# Patient Record
Sex: FEMALE | Race: OTHER | NOT HISPANIC OR LATINO | URBAN - METROPOLITAN AREA
[De-identification: names, ages, dates, MRNs, and addresses within clinical notes are randomized per-mention and may not be internally consistent; named-entity substitution may affect disease eponyms.]

---

## 2021-11-25 ENCOUNTER — INPATIENT (INPATIENT)
Facility: HOSPITAL | Age: 21
LOS: 1 days | Discharge: HOME | End: 2021-11-27
Attending: STUDENT IN AN ORGANIZED HEALTH CARE EDUCATION/TRAINING PROGRAM | Admitting: STUDENT IN AN ORGANIZED HEALTH CARE EDUCATION/TRAINING PROGRAM
Payer: MEDICAID

## 2021-11-25 VITALS
WEIGHT: 134.92 LBS | TEMPERATURE: 97 F | SYSTOLIC BLOOD PRESSURE: 124 MMHG | HEART RATE: 77 BPM | RESPIRATION RATE: 18 BRPM | DIASTOLIC BLOOD PRESSURE: 76 MMHG | OXYGEN SATURATION: 99 %

## 2021-11-25 LAB
ALBUMIN SERPL ELPH-MCNC: 4.4 G/DL — SIGNIFICANT CHANGE UP (ref 3.5–5.2)
ALP SERPL-CCNC: 79 U/L — SIGNIFICANT CHANGE UP (ref 30–115)
ALT FLD-CCNC: 12 U/L — SIGNIFICANT CHANGE UP (ref 0–41)
ANION GAP SERPL CALC-SCNC: 15 MMOL/L — HIGH (ref 7–14)
APAP SERPL-MCNC: <5 UG/ML — LOW (ref 10–30)
APPEARANCE UR: CLEAR — SIGNIFICANT CHANGE UP
AST SERPL-CCNC: 24 U/L — SIGNIFICANT CHANGE UP (ref 0–41)
BACTERIA # UR AUTO: NEGATIVE — SIGNIFICANT CHANGE UP
BASOPHILS # BLD AUTO: 0.02 K/UL — SIGNIFICANT CHANGE UP (ref 0–0.2)
BASOPHILS NFR BLD AUTO: 0.2 % — SIGNIFICANT CHANGE UP (ref 0–1)
BILIRUB SERPL-MCNC: 0.5 MG/DL — SIGNIFICANT CHANGE UP (ref 0.2–1.2)
BILIRUB UR-MCNC: NEGATIVE — SIGNIFICANT CHANGE UP
BUN SERPL-MCNC: 9 MG/DL — LOW (ref 10–20)
CALCIUM SERPL-MCNC: 9.1 MG/DL — SIGNIFICANT CHANGE UP (ref 8.5–10.1)
CHLORIDE SERPL-SCNC: 103 MMOL/L — SIGNIFICANT CHANGE UP (ref 98–110)
CO2 SERPL-SCNC: 18 MMOL/L — SIGNIFICANT CHANGE UP (ref 17–32)
COLOR SPEC: SIGNIFICANT CHANGE UP
CREAT SERPL-MCNC: 0.5 MG/DL — LOW (ref 0.7–1.5)
DIFF PNL FLD: NEGATIVE — SIGNIFICANT CHANGE UP
EOSINOPHIL # BLD AUTO: 0.01 K/UL — SIGNIFICANT CHANGE UP (ref 0–0.7)
EOSINOPHIL NFR BLD AUTO: 0.1 % — SIGNIFICANT CHANGE UP (ref 0–8)
EPI CELLS # UR: 3 /HPF — SIGNIFICANT CHANGE UP (ref 0–5)
ETHANOL SERPL-MCNC: <10 MG/DL — SIGNIFICANT CHANGE UP
GLUCOSE SERPL-MCNC: 146 MG/DL — HIGH (ref 70–99)
GLUCOSE UR QL: NEGATIVE — SIGNIFICANT CHANGE UP
HCG SERPL QL: NEGATIVE — SIGNIFICANT CHANGE UP
HCT VFR BLD CALC: 40.1 % — SIGNIFICANT CHANGE UP (ref 37–47)
HGB BLD-MCNC: 13.3 G/DL — SIGNIFICANT CHANGE UP (ref 12–16)
HYALINE CASTS # UR AUTO: 1 /LPF — SIGNIFICANT CHANGE UP (ref 0–7)
IMM GRANULOCYTES NFR BLD AUTO: 0.3 % — SIGNIFICANT CHANGE UP (ref 0.1–0.3)
KETONES UR-MCNC: ABNORMAL
LACTATE SERPL-SCNC: 1.7 MMOL/L — SIGNIFICANT CHANGE UP (ref 0.7–2)
LEUKOCYTE ESTERASE UR-ACNC: NEGATIVE — SIGNIFICANT CHANGE UP
LYMPHOCYTES # BLD AUTO: 1.99 K/UL — SIGNIFICANT CHANGE UP (ref 1.2–3.4)
LYMPHOCYTES # BLD AUTO: 19.3 % — LOW (ref 20.5–51.1)
MAGNESIUM SERPL-MCNC: 1.8 MG/DL — SIGNIFICANT CHANGE UP (ref 1.8–2.4)
MCHC RBC-ENTMCNC: 29.1 PG — SIGNIFICANT CHANGE UP (ref 27–31)
MCHC RBC-ENTMCNC: 33.2 G/DL — SIGNIFICANT CHANGE UP (ref 32–37)
MCV RBC AUTO: 87.7 FL — SIGNIFICANT CHANGE UP (ref 81–99)
MONOCYTES # BLD AUTO: 0.5 K/UL — SIGNIFICANT CHANGE UP (ref 0.1–0.6)
MONOCYTES NFR BLD AUTO: 4.9 % — SIGNIFICANT CHANGE UP (ref 1.7–9.3)
NEUTROPHILS # BLD AUTO: 7.75 K/UL — HIGH (ref 1.4–6.5)
NEUTROPHILS NFR BLD AUTO: 75.2 % — SIGNIFICANT CHANGE UP (ref 42.2–75.2)
NITRITE UR-MCNC: NEGATIVE — SIGNIFICANT CHANGE UP
NRBC # BLD: 0 /100 WBCS — SIGNIFICANT CHANGE UP (ref 0–0)
PH UR: 6 — SIGNIFICANT CHANGE UP (ref 5–8)
PLATELET # BLD AUTO: 289 K/UL — SIGNIFICANT CHANGE UP (ref 130–400)
POTASSIUM SERPL-MCNC: 5.2 MMOL/L — HIGH (ref 3.5–5)
POTASSIUM SERPL-SCNC: 5.2 MMOL/L — HIGH (ref 3.5–5)
PROT SERPL-MCNC: 7.5 G/DL — SIGNIFICANT CHANGE UP (ref 6–8)
PROT UR-MCNC: ABNORMAL
RBC # BLD: 4.57 M/UL — SIGNIFICANT CHANGE UP (ref 4.2–5.4)
RBC # FLD: 14.1 % — SIGNIFICANT CHANGE UP (ref 11.5–14.5)
RBC CASTS # UR COMP ASSIST: 1 /HPF — SIGNIFICANT CHANGE UP (ref 0–4)
SALICYLATES SERPL-MCNC: <0.3 MG/DL — LOW (ref 4–30)
SARS-COV-2 RNA SPEC QL NAA+PROBE: SIGNIFICANT CHANGE UP
SODIUM SERPL-SCNC: 136 MMOL/L — SIGNIFICANT CHANGE UP (ref 135–146)
SP GR SPEC: 1.03 — SIGNIFICANT CHANGE UP (ref 1.01–1.03)
TROPONIN T SERPL-MCNC: <0.01 NG/ML — SIGNIFICANT CHANGE UP
UROBILINOGEN FLD QL: SIGNIFICANT CHANGE UP
WBC # BLD: 10.3 K/UL — SIGNIFICANT CHANGE UP (ref 4.8–10.8)
WBC # FLD AUTO: 10.3 K/UL — SIGNIFICANT CHANGE UP (ref 4.8–10.8)
WBC UR QL: 3 /HPF — SIGNIFICANT CHANGE UP (ref 0–5)

## 2021-11-25 PROCEDURE — 99223 1ST HOSP IP/OBS HIGH 75: CPT

## 2021-11-25 PROCEDURE — 70450 CT HEAD/BRAIN W/O DYE: CPT | Mod: 26,MA

## 2021-11-25 PROCEDURE — 93010 ELECTROCARDIOGRAM REPORT: CPT

## 2021-11-25 PROCEDURE — 99285 EMERGENCY DEPT VISIT HI MDM: CPT

## 2021-11-25 RX ORDER — ACETAMINOPHEN 500 MG
975 TABLET ORAL ONCE
Refills: 0 | Status: COMPLETED | OUTPATIENT
Start: 2021-11-25 | End: 2021-11-25

## 2021-11-25 RX ORDER — INFLUENZA VIRUS VACCINE 15; 15; 15; 15 UG/.5ML; UG/.5ML; UG/.5ML; UG/.5ML
0.5 SUSPENSION INTRAMUSCULAR ONCE
Refills: 0 | Status: COMPLETED | OUTPATIENT
Start: 2021-11-25 | End: 2021-11-25

## 2021-11-25 RX ORDER — MAGNESIUM OXIDE 400 MG ORAL TABLET 241.3 MG
400 TABLET ORAL ONCE
Refills: 0 | Status: COMPLETED | OUTPATIENT
Start: 2021-11-25 | End: 2021-11-26

## 2021-11-25 RX ORDER — SODIUM CHLORIDE 9 MG/ML
1000 INJECTION, SOLUTION INTRAVENOUS ONCE
Refills: 0 | Status: COMPLETED | OUTPATIENT
Start: 2021-11-25 | End: 2021-11-25

## 2021-11-25 RX ORDER — ENOXAPARIN SODIUM 100 MG/ML
40 INJECTION SUBCUTANEOUS DAILY
Refills: 0 | Status: DISCONTINUED | OUTPATIENT
Start: 2021-11-25 | End: 2021-11-27

## 2021-11-25 RX ADMIN — Medication 975 MILLIGRAM(S): at 16:16

## 2021-11-25 RX ADMIN — SODIUM CHLORIDE 1000 MILLILITER(S): 9 INJECTION, SOLUTION INTRAVENOUS at 16:16

## 2021-11-25 NOTE — ED PROVIDER NOTE - ATTENDING CONTRIBUTION TO CARE
20yo F with no sig pmh, FMH of seizures here after episode of convulsions. per girlfriend and girlfriend's mother at bedside, pt had b/l arms stiffening and slight shaking around 1128am today and foaming at mouth while sitting in front seat passenger side of car. l;egs straightened out b/l.  they states 5-10 min prior to this they were taking to her and she was not answering them and possibly staring. pt states that she does not remember any of the questions and last thing she remembers was showering which was around 10am. slowly took 15 min or so to come around per girlfriend. dull posterior HA only after she woke up. no recent f/c, trauma, neck pain,, w/n/t. no other sxs now either aside from mild HA. no vomiting. no drug or Ocp use.   on exam, nontoxic, vss   Gen: Alert, NAD  Skin: Warm, dry, intact  Head: NCAT, PERRL  ENT: Mucous membranes moist, no tongue lac  Neck: Supple, no meningismus or bruits  CV: RRR, normal S1, S2, no m/r/g  Resp: Non labored respirations, Lungs CTA b/l, no wheezes, rales, rhonchi  Abdomen: Soft, nondistended, nontender  Extremities: Moving all extremities, no edema  Neuro: No focal neuro deficits, AAOx3, CNs II-XII intact, strength 5/5 in b/l shoulder abduction/adduction, elbow flexion/extension, wrist flexion/extension, interossei, hand , hip flexion/extension, knee flexion/extension, ankle flexion/extension, great toe flexion/extension, sensation grossly intact, FTN intact, no dysmetria, no ataxia, no drift  Psych: Cooperative     a/p: 20 yo F here after what appears to be first onset seizure. nonfocal exam now. and well appearing. given first onset, will check CTH and c/s neuro. disposition pending w/up and reassessment and neuro recs. labs reassuring, mild low bicarb and AG likely from seizure.

## 2021-11-25 NOTE — H&P ADULT - NSICDXFAMILYHX_GEN_ALL_CORE_FT
FAMILY HISTORY:  Father  Still living? Yes, Estimated age: Age Unknown  FH: seizures, Age at diagnosis: Age Unknown

## 2021-11-25 NOTE — ED PROVIDER NOTE - OBJECTIVE STATEMENT
20 yo female, no pmh, presents to ed for seizure like activity. per partner at bedside, sat in car, pt had loc with limb shaking for 1 min, a/w foaming at mouth, now back at baseline. pt without hx of seizures. c/o mild, aching, no radiation generalized ha. denies fever, chills, cp, sob, abd pain, nvd, neck pain, back pain.

## 2021-11-25 NOTE — ED PROVIDER NOTE - PHYSICAL EXAMINATION
Physical Exam    Vital Signs: I have reviewed the initial vital signs.  Constitutional: well-nourished, appears stated age, no acute distress  Eyes: Conjunctiva pink, Sclera clear, PERRLA, EOMI.  ENT: no tongue laceration   Cardiovascular: S1 and S2, regular rate, regular rhythm, well-perfused extremities, radial pulses equal and 2+  Respiratory: unlabored respiratory effort, clear to auscultation bilaterally no wheezing, rales and rhonchi  Gastrointestinal: soft, non-tender abdomen, no pulsatile mass, normal bowl sounds  Musculoskeletal: supple neck, no lower extremity edema, no midline tenderness  Integumentary: warm, dry, no rash  Neurologic: awake, alert, cranial nerves II-XII grossly intact, extremities’ motor and sensory functions grossly intact, normal speech, no pronator drift.

## 2021-11-25 NOTE — ED ADULT TRIAGE NOTE - CHIEF COMPLAINT QUOTE
Pt BIBA after episode of witnessed seizure like activity PTA. As per pt partner pt was fine when eyes suddenly rolled in back of head and pt arms were shaking while sitting in car, denies head injury. Pt AAOx's 3 in triage, denies drug and alcohol use. Pt denies history of seizures. Pt c/o headache since event,  in triage. Pt BIBA after episode of witnessed seizure like activity PTA. As per pt partner pt was fine when eyes suddenly rolled in back of head and pt arms were shaking while sitting in car, denies head injury, denies fall. Pt AAOx's 3 in triage answering questions appropriately, denies drug and alcohol use. Pt denies history of seizures. Pt c/o headache since event,  in triage. Pt BIBA after episode of witnessed seizure like activity PTA. As per pt partner pt was fine when eyes suddenly rolled in back of head and pt arms were shaking while sitting in car, denies head injury, denies fall. Pt AAOx's 3 in triage answering questions appropriately, denies drug and alcohol use. Pt denies history of seizures, denies urinary incontinence denies biting tongue. Pt c/o headache since event,  in triage.

## 2021-11-25 NOTE — CONSULT NOTE ADULT - ATTENDING COMMENTS
Patient seen and examined and agree with above except as noted.  Patients history, notes, labs, imaging, vitals and meds reviewed personally.  Patient with history of seizure in her father however she is not clear of details.  Currently back to baseline  Lives in [place] but staying with her significant other currently in [place]    Plan   1. VEEG  2. Seizure precautions  3. No AEDs at this time

## 2021-11-25 NOTE — ED ADULT NURSE NOTE - DRUG PRE-SCREENING (DAST -1)
Limited OB US :   EFW : 7 lb 4 oz   REX : 126 mm  EGA : 37.4 wks   Placenta anterior and fundal .   VTX presentation .      BPP:   Breathing + 2  Movement : + 2  Tone : + 2   REX : 126 mm - + 2     8/8 Statement Selected

## 2021-11-25 NOTE — CONSULT NOTE ADULT - SUBJECTIVE AND OBJECTIVE BOX
Stroke Consult Note:    KARLENE SALGUERO    HPI:  21F no PMHx here with suspected seizure. History obtained from partner/ wife at bedside. Pt last memories are taking shower in am. Pt went to get into car with partner and mother; noted to have "dazed" appearance, not conversant but able to get into car. After getting in car pt with diffuse spasm of all four extremities with rigidity, foaming at mouth. EMS called, episode lasted for approx 5 min. Afterwards she was confused; unable to speak until 15 min after event. No urinary or bowel incontinence; +tongue, buccal biting. Currently pt with some confusion, unable to recount president or date. No illicit drug use, substance abuse. No medications/ alcohol. No h/o seizure in past; no travel hx, sick contacts, fever. Notably father hx seizures, not on aed currently. (25 Nov 2021 18:21)       Relevant Brain Tissue Imaging:  < from: CT Head No Cont (11.25.21 @ 14:19) >  IMPRESSION:  No evidence of acute transcortical infarct, hydrocephalus, acuteintracranial hemorrhage, or mass effect.    --- End of Report ---      < end of copied text >    Relevant blood tests:                          13.3   10.30 )-----------( 289      ( 25 Nov 2021 11:55 )             40.1   11-25    136  |  103  |  9<L>  ----------------------------<  146<H>  5.2<H>   |  18  |  0.5<L>    Ca    9.1      25 Nov 2021 11:55  Mg     1.8     11-25    TPro  7.5  /  Alb  4.4  /  TBili  0.5  /  DBili  x   /  AST  24  /  ALT  12  /  AlkPhos  79  11-25      Home Medications:      MEDICATIONS  (STANDING):  enoxaparin Injectable 40 milliGRAM(s) SubCutaneous daily      10. PT/OT/Speech/Rehab/S&Sw/ Cognitive eval results and recommendations:    Exam:    Vital Signs Last 24 Hrs  T(C): 36.8 (25 Nov 2021 15:50), Max: 36.8 (25 Nov 2021 15:50)  T(F): 98.3 (25 Nov 2021 15:50), Max: 98.3 (25 Nov 2021 15:50)  HR: 78 (25 Nov 2021 15:50) (77 - 78)  BP: 119/77 (25 Nov 2021 15:50) (119/77 - 124/76)  BP(mean): --  RR: 18 (25 Nov 2021 15:50) (18 - 18)  SpO2: 100% (25 Nov 2021 15:50) (99% - 100%)    Neurologic Exam:  Patient seems to be in post-ictal confusion.   Mental status: Awake, alert and oriented to person and place, but not president or month. Attention and concentration intact. Fund of knowledge appropriate  Language: Naming, repetition, fluency, and comprehension intact. No dysarthria, no aphasia  Cranial nerves: Visual fields full. No nystagmus. Extraocular muscles intact. Face symmetric. Hearing intact, +tongue and buccal biting   Motor:  Normal bulk and tone. Strength:    5/5 in RUE  5/5 in LUE  5/5 in RLE  5/5 in LLE   strength 5/5  Sensation: Intact to light touch, proprioception, and noxious stimuli. No neglect noted  Coordination: No dysmetria on finger-to-nose.  Meningeal signs-negative     Assessment: 21F no PMHx presents to the ED for seizure like activity. Patient was noted to have clenched fist and foaming in the mouth in the car which lasted 3-4 minutes. No history of seizures . CTH-negative. On exam, patient seems to be post ictal, no difficulty breathing. Patient is AxO x2 but not to president and month. Noted to have tongue and buccal biting.    Suggestions:  Transfer patient to [facility] EMU for close monitoring   Seizure precautions  Keep Mg>2   Discussed with Dr. Hollins

## 2021-11-25 NOTE — H&P ADULT - ASSESSMENT
21F no PMHx here with suspected seizure.    #Seizure, suspected  cth neg  admit to medicine, transfer to [facility]  f/u neuro  seizure precautions  #DVT ppx  lovenox    #Progress Note Handoff:  Pending (specify):  Consults_________, Tests________, Test Results_______, Other___veeg______  Family discussion: d/w pt at bedside re: treatment plan, primary dx  Disposition: Home_x__/SNF___/Other________/Unknown at this time________   21F no PMHx here with suspected seizure.    #Seizure, suspected  cth neg  admit to medicine, transfer to [facility]  f/u neuro  repeat bmp  seizure precautions  #DVT ppx  lovenox    #Progress Note Handoff:  Pending (specify):  Consults_________, Tests________, Test Results_______, Other___veeg______  Family discussion: d/w pt at bedside re: treatment plan, primary dx  Disposition: Home_x__/SNF___/Other________/Unknown at this time________

## 2021-11-25 NOTE — H&P ADULT - HISTORY OF PRESENT ILLNESS
21F no PMHx here with suspected seizure. History obtained from partner/ wife at bedside. Pt last memories are taking shower in am. Pt went to get into car with partner and mother; noted to have "dazed" appearance, not conversant but able to get into car. After getting in car pt with diffuse spasm of all four extremities with rigidity, foaming at mouth. EMS called, episode lasted for approx 5 min. Afterwards she was confused; unable to speak until 15 min after event. No urinary or bowel incontinence; +tongue, buccal biting. Currently pt with some confusion, unable to recount president or date. No illicit drug use, substance abuse. No medications/ alcohol. No h/o seizure in past; no travel hx, sick contacts, fever. Notably father hx seizures, not on aed currently.

## 2021-11-25 NOTE — ED PROVIDER NOTE - CLINICAL SUMMARY MEDICAL DECISION MAKING FREE TEXT BOX
pt here with first onset seizure. ED w/up including CTH and labs reassuring. no focal deficits here. consulted neuro and rec admission for EEG monitoring. admitted and transferred to PB site for monitoring in stable condition.

## 2021-11-25 NOTE — ED ADULT NURSE NOTE - CHIEF COMPLAINT QUOTE
Pt BIBA after episode of witnessed seizure like activity PTA. As per pt partner pt was fine when eyes suddenly rolled in back of head and pt arms were shaking while sitting in car, denies head injury, denies fall. Pt AAOx's 3 in triage answering questions appropriately, denies drug and alcohol use. Pt denies history of seizures, denies urinary incontinence denies biting tongue. Pt c/o headache since event,  in triage.

## 2021-11-26 LAB
MAGNESIUM SERPL-MCNC: 1.9 MG/DL — SIGNIFICANT CHANGE UP (ref 1.8–2.4)
PCP SPEC-MCNC: SIGNIFICANT CHANGE UP

## 2021-11-26 PROCEDURE — 99233 SBSQ HOSP IP/OBS HIGH 50: CPT

## 2021-11-26 PROCEDURE — 93010 ELECTROCARDIOGRAM REPORT: CPT

## 2021-11-26 PROCEDURE — 99222 1ST HOSP IP/OBS MODERATE 55: CPT

## 2021-11-26 RX ORDER — ACETAMINOPHEN 500 MG
650 TABLET ORAL EVERY 4 HOURS
Refills: 0 | Status: DISCONTINUED | OUTPATIENT
Start: 2021-11-26 | End: 2021-11-27

## 2021-11-26 RX ORDER — PANTOPRAZOLE SODIUM 20 MG/1
40 TABLET, DELAYED RELEASE ORAL
Refills: 0 | Status: DISCONTINUED | OUTPATIENT
Start: 2021-11-26 | End: 2021-11-27

## 2021-11-26 RX ORDER — IBUPROFEN 200 MG
600 TABLET ORAL ONCE
Refills: 0 | Status: COMPLETED | OUTPATIENT
Start: 2021-11-26 | End: 2021-11-26

## 2021-11-26 RX ADMIN — MAGNESIUM OXIDE 400 MG ORAL TABLET 400 MILLIGRAM(S): 241.3 TABLET ORAL at 05:16

## 2021-11-26 RX ADMIN — Medication 600 MILLIGRAM(S): at 05:17

## 2021-11-26 RX ADMIN — Medication 650 MILLIGRAM(S): at 01:40

## 2021-11-26 RX ADMIN — Medication 600 MILLIGRAM(S): at 05:41

## 2021-11-26 RX ADMIN — PANTOPRAZOLE SODIUM 40 MILLIGRAM(S): 20 TABLET, DELAYED RELEASE ORAL at 11:38

## 2021-11-26 RX ADMIN — Medication 650 MILLIGRAM(S): at 01:02

## 2021-11-26 RX ADMIN — ENOXAPARIN SODIUM 40 MILLIGRAM(S): 100 INJECTION SUBCUTANEOUS at 11:40

## 2021-11-26 NOTE — PROGRESS NOTE ADULT - ASSESSMENT
21F no PMHx here with suspected seizure.    Acute Seizure  - REEG  - Neurology f/u  - Role for VEEG? Neuro to decide  - No events over night   - Ativan 2mg IVP for seizures lasting 3-5 min and then call MD  - Check Urine Drug Screen   - CT Brain no acute findings     DVT Proph - Hep sq    Dispo: TBD / Neuro f/u

## 2021-11-27 ENCOUNTER — TRANSCRIPTION ENCOUNTER (OUTPATIENT)
Age: 21
End: 2021-11-27

## 2021-11-27 VITALS
TEMPERATURE: 97 F | DIASTOLIC BLOOD PRESSURE: 58 MMHG | RESPIRATION RATE: 16 BRPM | SYSTOLIC BLOOD PRESSURE: 106 MMHG | HEART RATE: 82 BPM

## 2021-11-27 LAB
COVID-19 NUCLEOCAPSID GAM AB INTERP: POSITIVE
COVID-19 NUCLEOCAPSID TOTAL GAM ANTIBODY RESULT: 3.49 INDEX — HIGH
COVID-19 SPIKE DOMAIN AB INTERP: POSITIVE
COVID-19 SPIKE DOMAIN ANTIBODY RESULT: >250 U/ML — HIGH
SARS-COV-2 IGG+IGM SERPL QL IA: 3.49 INDEX — HIGH
SARS-COV-2 IGG+IGM SERPL QL IA: >250 U/ML — HIGH
SARS-COV-2 IGG+IGM SERPL QL IA: POSITIVE
SARS-COV-2 IGG+IGM SERPL QL IA: POSITIVE

## 2021-11-27 PROCEDURE — 99239 HOSP IP/OBS DSCHRG MGMT >30: CPT

## 2021-11-27 PROCEDURE — 99231 SBSQ HOSP IP/OBS SF/LOW 25: CPT

## 2021-11-27 NOTE — DISCHARGE NOTE PROVIDER - PROVIDER TOKENS
PROVIDER:[TOKEN:[[id number]],FOLLOWUP:[Routine]],PROVIDER:[TOKEN:[[id number]],FOLLOWUP:[1 week]]

## 2021-11-27 NOTE — DISCHARGE NOTE NURSING/CASE MANAGEMENT/SOCIAL WORK - PATIENT PORTAL LINK FT
You can access the FollowMyHealth Patient Portal offered by [facility] by registering at the following website: [email or web address]. By joining [employer]’s FollowMyHealth portal, you will also be able to view your health information using other applications (apps) compatible with our system.

## 2021-11-27 NOTE — EEG REPORT - NS EEG TEXT BOX
VIDEO EEG FINAL REPORT      KARLENE SALGUERO    21y Female  MRN [id number]      Recording Technique: The patient underwent continuous video-EEG monitoring using Telemetry System hardware on the XL [employer] Digital System. EEG and video data were stored on a computer hard drive with important events saved in digital archive files. The material was reviewed by a physician (electroencephalographer/epileptologist) on a daily basis. Jeff and seizure detection algorithms were utilized if needed. An EEG technician attended to the patient for 8 to 10 hours per day. The patient was observed by the epilepsy nursing staff 24 hrs per day. The epilepsy center neurologist was available in person or on call 24 hours per day.    Placement and Labeling of Eelectrodes: The EEG was performed using at least 20 channel referential EEG connections (coronal over temporal over parasaggital montage) with inferior temporal electrodes when indicting and using all standard 10-20 electrode placement with EKG, with additional electrodes placed in the inferior temporal region using the modified 10-10 montage electrode placements for elective admissions, or if deemed necessary. Recording was at a sampling rate of 256 samples per second per channel. Time syncronized digital video recording was done simultaneously with EEG recording. A low light infrared camera was used for low light recording.      HPI:  21F no PMHx here with suspected seizure. History obtained from partner/ wife at bedside. Pt last memories are taking shower in am. Pt went to get into car with partner and mother; noted to have "dazed" appearance, not conversant but able to get into car. After getting in car pt with diffuse spasm of all four extremities with rigidity, foaming at mouth. EMS called, episode lasted for approx 5 min. Afterwards she was confused; unable to speak until 15 min after event. No urinary or bowel incontinence; +tongue, buccal biting. Currently pt with some confusion, unable to recount president or date. No illicit drug use, substance abuse. No medications/ alcohol. No h/o seizure in past; no travel hx, sick contacts, fever. Notably father hx seizures, not on aed currently. (25 Nov 2021 18:21)      MEDICATIONS  (STANDING):  enoxaparin Injectable 40 milliGRAM(s) SubCutaneous daily  influenza   Vaccine 0.5 milliLiter(s) IntraMuscular once  pantoprazole    Tablet 40 milliGRAM(s) Oral before breakfast    MEDICATIONS  (PRN):  acetaminophen     Tablet .. 650 milliGRAM(s) Oral every 4 hours PRN Temp greater or equal to 38C (100.4F), Mild Pain (1 - 3)        AWAKE  The recording during the wakefulness consists of a symmetrical and well-organized background and posterior dominant rhythm in the range of 10 Hz, which is reactive to eye opening and eye closure and change in the level of alertness.      ASLEEP  Different stages of sleep were preserved well. Stage II of non-REM sleep was characterized by the presence of symmetrical and well-defined sleep spindles and vertex sharp waves. The deeper stages of sleep were identified by the presence of low theta and delta range activity seen diffusely over both hemispheres and more prominently from the frontal and central derivations. All stages captured and symmetric.      GENERALIZED SLOWING  None    FOCAL SLOWING    None  BREACH ARTIFACT  None    ACTIVATION PROCEDURES  Hyperventilation:  Photic Stimulation:    NONE  SLEEP DEPRIVATION/MEDICATION REDUCTION      EPILEPTIFORM ACTIVITY  None          EVENTS/SEIZURES    None  IMPRESSION  Normal VEEG     CLINICAL CORRELATION  A normal VEEG study does not exclude the clinical diagnosis of epilespy, but no supporting evidence was present on this study.

## 2021-11-27 NOTE — PROGRESS NOTE ADULT - SUBJECTIVE AND OBJECTIVE BOX
KARLENE SALGUERO    HPI:  21F no PMHx here with suspected seizure. History obtained from partner/ wife at bedside. Pt last memories are taking shower in am. Pt went to get into car with partner and mother; noted to have "dazed" appearance, not conversant but able to get into car. After getting in car pt with diffuse spasm of all four extremities with rigidity, foaming at mouth. EMS called, episode lasted for approx 5 min. Afterwards she was confused; unable to speak until 15 min after event. No urinary or bowel incontinence; +tongue, buccal biting. Currently pt with some confusion, unable to recount president or date. No illicit drug use, substance abuse. No medications/ alcohol. No h/o seizure in past; no travel hx, sick contacts, fever. Notably father hx seizures, not on aed currently. (25 Nov 2021 18:21)    Overnight, No events. VEEG results below.        Relevant Brain Tissue Imaging:  < from: CT Head No Cont (11.25.21 @ 14:19) >  IMPRESSION:  No evidence of acute transcortical infarct, hydrocephalus, acuteintracranial hemorrhage, or mass effect.    --- End of Report ---      < end of copied text >    Relevant blood tests:                          13.3   10.30 )-----------( 289      ( 25 Nov 2021 11:55 )             40.1   11-25    136  |  103  |  9<L>  ----------------------------<  146<H>  5.2<H>   |  18  |  0.5<L>    Ca    9.1      25 Nov 2021 11:55  Mg     1.8     11-25    TPro  7.5  /  Alb  4.4  /  TBili  0.5  /  DBili  x   /  AST  24  /  ALT  12  /  AlkPhos  79  11-25      Home Medications:      MEDICATIONS  (STANDING):  enoxaparin Injectable 40 milliGRAM(s) SubCutaneous daily      VEEG: unremarkable. See report in chart.     Exam:    Vital Signs Last 24 Hrs  T(C): 36.8 (25 Nov 2021 15:50), Max: 36.8 (25 Nov 2021 15:50)  T(F): 98.3 (25 Nov 2021 15:50), Max: 98.3 (25 Nov 2021 15:50)  HR: 78 (25 Nov 2021 15:50) (77 - 78)  BP: 119/77 (25 Nov 2021 15:50) (119/77 - 124/76)  BP(mean): --  RR: 18 (25 Nov 2021 15:50) (18 - 18)  SpO2: 100% (25 Nov 2021 15:50) (99% - 100%)    Neurologic Exam:    Mental status: Awake, alert and oriented to person and place, but not president or month. Attention and concentration intact. Fund of knowledge appropriate  Language: Naming, repetition, fluency, and comprehension intact. No dysarthria, no aphasia  Cranial nerves: Visual fields full. No nystagmus. Extraocular muscles intact. Face symmetric. Hearing intact,   Motor:  Normal bulk and tone. Strength:    5/5 in RUE  5/5 in LUE  5/5 in RLE  5/5 in LLE   strength 5/5  Sensation: Intact to light touch, proprioception, and noxious stimuli. No neglect noted  Coordination: No dysmetria on finger-to-nose.  Meningeal signs-negative     Assessment: 21F no PMHx presents to the ED for seizure like activity. Patient was noted to have clenched fist and foaming in the mouth in the car which lasted 3-4 minutes. No history of seizures . CTH-negative.Plans:   1. Will dc today   2. At this time, no indication for chronic anti-seizure medication  3. Pt needs to stop driving for 12 months in [place]   4. F/u with Dr. Salvador as an outpatient ( neurology).   5. Call Neurology with any further events. 
S: Pt has no complaints awaiting REEG / Family at bedside     Vital Signs Last 24 Hrs  T(C): 36.4 (26 Nov 2021 04:09), Max: 37 (25 Nov 2021 23:02)  T(F): 97.6 (26 Nov 2021 04:09), Max: 98.6 (25 Nov 2021 23:02)  HR: 91 (26 Nov 2021 04:09) (77 - 97)  BP: 104/59 (26 Nov 2021 04:09) (104/59 - 124/76)  RR: 18 (26 Nov 2021 04:09) (18 - 18)  SpO2: 100% (25 Nov 2021 15:50) (99% - 100%)    GEN: NAD  Resp: Clear B/L  GI: +BS, Soft, NT, ND  Ext: No e/c/c  MS AOx3                        13.3   10.30 )-----------( 289      ( 25 Nov 2021 11:55 )             40.1     11-25    136  |  103  |  9<L>  ----------------------------<  146<H>  5.2<H>   |  18  |  0.5<L>    Ca    9.1      25 Nov 2021 11:55    Mg     1.8     11-25    TPro  7.5  /  Alb  4.4  /  TBili  0.5  /  DBili  x   /  AST  24  /  ALT  12  /  AlkPhos  79  11-25    MEDICATIONS  (STANDING):  enoxaparin Injectable 40 milliGRAM(s) SubCutaneous daily  influenza   Vaccine 0.5 milliLiter(s) IntraMuscular once  pantoprazole    Tablet 40 milliGRAM(s) Oral before breakfast    MEDICATIONS  (PRN):  acetaminophen     Tablet .. 650 milliGRAM(s) Oral every 4 hours PRN Temp greater or equal to 38C (100.4F), Mild Pain (1 - 3)    CT-Brain:  IMPRESSION:  No evidence of acute transcortical infarct, hydrocephalus, acute intracranial hemorrhage, or mass effect.

## 2021-11-27 NOTE — DISCHARGE NOTE PROVIDER - HOSPITAL COURSE
Pt is a 21F admitted for possible seizure activity at home. Pt was admitted and monitored on VEEG. No evidence of seizure activity was noted on VEEG. Pt was seen and cleare dby neurology for discharge. No AEDs were started during admission per neuro recommendations. Pt is a 21F admitted for possible seizure activity at home. Pt was admitted and monitored on VEEG. No evidence of seizure activity was noted on VEEG. Pt was seen and cleare [facility] neurology for discharge. No AEDs were started during admission per neuro recommendations.     Vital Signs Last 24 Hrs  T(C): 36.2 (27 Nov 2021 05:00), Max: 36.9 (26 Nov 2021 21:21)  T(F): 97.1 (27 Nov 2021 05:00), Max: 98.4 (26 Nov 2021 21:21)  HR: 81 (27 Nov 2021 05:00) (81 - 87)  BP: 109/51 (27 Nov 2021 05:00) (102/50 - 124/66)  RR: 16 (27 Nov 2021 05:00) (16 - 18)  SpO2: 98% RA    CV: NL S1/2  Resp: CTA B/L  GI: +BS, Soft, NT, ND  Ext: No e/c/c  MS: AOx3    No new events while in the hospital     REEG Reviewed by Neurologist and pt cleared for discharge home.  - follow with [place] in 1-2 wks  - NO AEDs at this time  - NO DRIVING or Operating machinery   - All discussed with PT

## 2021-11-27 NOTE — DISCHARGE NOTE PROVIDER - NSDCCPCAREPLAN_GEN_ALL_CORE_FT
PRINCIPAL DISCHARGE DIAGNOSIS  Diagnosis: New onset seizure  Assessment and Plan of Treatment: VEEG was perforned and there was no evidence of seizure activity noted.   You were seen and cleared for discharge by neurology.  No new medications were started.   You should refrain from driving until cleared by your neurologist.  You should follow up with neurology, Dr. Salvador, upon discharge.       PRINCIPAL DISCHARGE DIAGNOSIS  Diagnosis: New onset seizure  Assessment and Plan of Treatment: VEEG was perforned and there was no evidence of seizure activity noted.   You were seen and cleared for discharge by neurology.  No new medications were started.   You should refrain from driving until cleared by your neurologist.  You should follow up with neurology, Dr. Salvador, upon discharge in 1-2 wks.

## 2021-11-27 NOTE — DISCHARGE NOTE PROVIDER - CARE PROVIDER_API CALL
Alberto Salvador)  Neurology  [address] [place] [address], [address]  [place] [address]  Phone: [phone]  Fax: [phone]  Follow Up Time: Routine    Dennis Rodríguez  INTERNAL MEDICINE  [address] [place] [address]  Phone: [phone]  Fax: [phone]  Follow Up Time: 1 week

## 2021-11-30 PROBLEM — Z78.9 OTHER SPECIFIED HEALTH STATUS: Chronic | Status: ACTIVE | Noted: 2021-11-25

## 2021-12-02 DIAGNOSIS — R56.9 UNSPECIFIED CONVULSIONS: ICD-10-CM

## 2021-12-07 ENCOUNTER — APPOINTMENT (OUTPATIENT)
Dept: NEUROLOGY | Facility: CLINIC | Age: 21
End: 2021-12-07

## 2021-12-07 PROBLEM — Z00.00 ENCOUNTER FOR PREVENTIVE HEALTH EXAMINATION: Status: ACTIVE | Noted: 2021-12-07

## 2023-01-04 NOTE — H&P ADULT - GENERAL
Obese BF well developed, well nourished , in no acute distress , ambulating without difficulty , normal communication ability 
details…

## 2023-10-17 NOTE — ED PROVIDER NOTE - ADMIT DISPOSITION PRESENT ON ADMISSION SEPSIS
Pre-Operative Diagnosis: Primary osteoarthritis of left knee [M17.12]  Effusion of left knee [M25.462]  Calcium pyrophosphate deposition disease (CPPD) [M11.20]     Post-Operative Diagnosis: Left knee medial and lateral meniscus tears, chondromalacia, chondrocalcinosis, and synovitis     Procedure Performed:   LEFT KNEE ARTHROSCOPY, PARTIAL MEDIAL MENISCECTOMY, PARTIAL LATERAL MENISCECTOMY, PARTIAL SYNOVECTOMY, AND CHONDROPLASTY.     Surgeon(s) and Role:     Matthew Sharma MD - Primary    Assistant(s):  PA: Dorothea Estrella PA-C and Armen KAUR     Surgical Findings: see operative report      Specimen: na      Estimated Blood Loss: 10cc        Tita Magana PA-C  10/17/2023  2:11 PM No